# Patient Record
Sex: FEMALE | Race: WHITE | NOT HISPANIC OR LATINO | Employment: FULL TIME | URBAN - METROPOLITAN AREA
[De-identification: names, ages, dates, MRNs, and addresses within clinical notes are randomized per-mention and may not be internally consistent; named-entity substitution may affect disease eponyms.]

---

## 2024-05-15 PROBLEM — I10 HYPERTENSION: Status: ACTIVE | Noted: 2017-04-17

## 2024-05-15 PROBLEM — E78.5 HYPERLIPIDEMIA: Status: ACTIVE | Noted: 2017-04-17

## 2024-05-15 PROBLEM — N39.3 STRESS INCONTINENCE: Status: ACTIVE | Noted: 2024-05-15

## 2024-05-15 PROBLEM — M51.36 DEGENERATION OF INTERVERTEBRAL DISC OF LUMBAR REGION: Status: ACTIVE | Noted: 2018-03-14

## 2024-05-15 PROBLEM — M79.7 FIBROMYOSITIS: Status: ACTIVE | Noted: 2018-03-14

## 2024-05-15 PROBLEM — G47.10 HYPERSOMNIA: Status: ACTIVE | Noted: 2020-01-20

## 2024-05-15 PROBLEM — E55.9 VITAMIN D DEFICIENCY: Status: ACTIVE | Noted: 2017-04-17

## 2024-05-15 PROBLEM — E66.9 OBESITY WITH BODY MASS INDEX 30 OR GREATER: Status: ACTIVE | Noted: 2020-06-15

## 2024-05-15 PROBLEM — K21.9 GERD (GASTROESOPHAGEAL REFLUX DISEASE): Status: ACTIVE | Noted: 2024-05-15

## 2024-05-15 PROBLEM — G47.33 OBSTRUCTIVE SLEEP APNEA: Status: ACTIVE | Noted: 2024-05-15

## 2024-05-15 PROBLEM — R35.0 URINARY FREQUENCY: Status: ACTIVE | Noted: 2024-05-15

## 2024-05-15 PROBLEM — M17.9 OSTEOARTHRITIS OF KNEE: Status: ACTIVE | Noted: 2018-03-14

## 2024-05-15 PROBLEM — M47.817 SPONDYLOSIS OF LUMBOSACRAL SPINE WITHOUT MYELOPATHY: Status: ACTIVE | Noted: 2018-03-14

## 2024-05-15 PROBLEM — K65.4 SCLEROSING MESENTERITIS (MULTI): Status: ACTIVE | Noted: 2024-05-15

## 2024-05-16 RX ORDER — TOPIRAMATE 50 MG/1
50 TABLET, FILM COATED ORAL
COMMUNITY
Start: 2022-09-28

## 2024-05-16 RX ORDER — FLUTICASONE FUROATE AND VILANTEROL 200; 25 UG/1; UG/1
1 POWDER RESPIRATORY (INHALATION)
COMMUNITY

## 2024-05-16 RX ORDER — ALBUTEROL SULFATE 0.83 MG/ML
2.5 SOLUTION RESPIRATORY (INHALATION) EVERY 6 HOURS PRN
COMMUNITY
Start: 2024-04-15

## 2024-05-16 RX ORDER — ESCITALOPRAM OXALATE 20 MG/1
1 TABLET ORAL
COMMUNITY
Start: 2024-03-10

## 2024-05-16 RX ORDER — LOSARTAN POTASSIUM 25 MG/1
25 TABLET ORAL DAILY
COMMUNITY
Start: 2023-01-24 | End: 2024-05-17 | Stop reason: ALTCHOICE

## 2024-05-16 RX ORDER — NORTRIPTYLINE HYDROCHLORIDE 50 MG/1
50 CAPSULE ORAL
COMMUNITY
Start: 2023-06-29 | End: 2024-05-17 | Stop reason: ALTCHOICE

## 2024-05-16 RX ORDER — PANTOPRAZOLE SODIUM 20 MG/1
20 TABLET, DELAYED RELEASE ORAL
COMMUNITY
Start: 2022-07-14 | End: 2024-05-17 | Stop reason: ALTCHOICE

## 2024-05-16 RX ORDER — MELOXICAM 7.5 MG/1
7.5 TABLET ORAL
COMMUNITY
Start: 2022-09-27 | End: 2024-05-17 | Stop reason: ALTCHOICE

## 2024-05-16 RX ORDER — RIZATRIPTAN BENZOATE 10 MG/1
10 TABLET, ORALLY DISINTEGRATING ORAL
COMMUNITY
Start: 2024-05-01 | End: 2025-05-01

## 2024-05-16 RX ORDER — ATORVASTATIN CALCIUM 20 MG/1
20 TABLET, FILM COATED ORAL
COMMUNITY
Start: 2022-05-28

## 2024-05-16 RX ORDER — ONDANSETRON 4 MG/1
4 TABLET, ORALLY DISINTEGRATING ORAL EVERY 8 HOURS PRN
COMMUNITY
Start: 2023-06-29 | End: 2024-06-28

## 2024-05-17 ENCOUNTER — OFFICE VISIT (OUTPATIENT)
Dept: CARDIOLOGY | Facility: CLINIC | Age: 54
End: 2024-05-17

## 2024-05-17 VITALS
BODY MASS INDEX: 38.16 KG/M2 | OXYGEN SATURATION: 97 % | DIASTOLIC BLOOD PRESSURE: 69 MMHG | HEART RATE: 66 BPM | WEIGHT: 243.1 LBS | SYSTOLIC BLOOD PRESSURE: 104 MMHG | HEIGHT: 67 IN

## 2024-05-17 DIAGNOSIS — I10 HYPERTENSION, UNSPECIFIED TYPE: Primary | ICD-10-CM

## 2024-05-17 DIAGNOSIS — R07.89 CHEST TIGHTNESS: ICD-10-CM

## 2024-05-17 DIAGNOSIS — I49.3 PVC (PREMATURE VENTRICULAR CONTRACTION): ICD-10-CM

## 2024-05-17 PROCEDURE — 3074F SYST BP LT 130 MM HG: CPT | Performed by: INTERNAL MEDICINE

## 2024-05-17 PROCEDURE — 3078F DIAST BP <80 MM HG: CPT | Performed by: INTERNAL MEDICINE

## 2024-05-17 PROCEDURE — 99203 OFFICE O/P NEW LOW 30 MIN: CPT | Performed by: INTERNAL MEDICINE

## 2024-05-17 PROCEDURE — 1036F TOBACCO NON-USER: CPT | Performed by: INTERNAL MEDICINE

## 2024-05-17 PROCEDURE — 99213 OFFICE O/P EST LOW 20 MIN: CPT | Performed by: INTERNAL MEDICINE

## 2024-05-17 RX ORDER — ATENOLOL 25 MG/1
25 TABLET ORAL DAILY
Qty: 90 TABLET | Refills: 3 | Status: SHIPPED | OUTPATIENT
Start: 2024-05-17 | End: 2025-05-17

## 2024-05-17 RX ORDER — ALBUTEROL SULFATE 90 UG/1
1 AEROSOL, METERED RESPIRATORY (INHALATION)
COMMUNITY
Start: 2024-05-11

## 2024-05-17 ASSESSMENT — ENCOUNTER SYMPTOMS
VOMITING: 0
NAUSEA: 0
HEMOPTYSIS: 0
LOSS OF SENSATION IN FEET: 0
HEMATURIA: 0
MYALGIAS: 0
FEVER: 0
DYSURIA: 0
WHEEZING: 0
CHILLS: 0
MEMORY LOSS: 0
ALTERED MENTAL STATUS: 0
OCCASIONAL FEELINGS OF UNSTEADINESS: 0
COUGH: 0
CONSTIPATION: 0
ABDOMINAL PAIN: 0
BLOATING: 0
DEPRESSION: 0
HEADACHES: 0
DIARRHEA: 0
FALLS: 0

## 2024-05-17 ASSESSMENT — PATIENT HEALTH QUESTIONNAIRE - PHQ9
10. IF YOU CHECKED OFF ANY PROBLEMS, HOW DIFFICULT HAVE THESE PROBLEMS MADE IT FOR YOU TO DO YOUR WORK, TAKE CARE OF THINGS AT HOME, OR GET ALONG WITH OTHER PEOPLE: NOT DIFFICULT AT ALL
SUM OF ALL RESPONSES TO PHQ9 QUESTIONS 1 AND 2: 2
2. FEELING DOWN, DEPRESSED OR HOPELESS: SEVERAL DAYS
1. LITTLE INTEREST OR PLEASURE IN DOING THINGS: SEVERAL DAYS

## 2024-05-17 ASSESSMENT — COLUMBIA-SUICIDE SEVERITY RATING SCALE - C-SSRS
6. HAVE YOU EVER DONE ANYTHING, STARTED TO DO ANYTHING, OR PREPARED TO DO ANYTHING TO END YOUR LIFE?: NO
1. IN THE PAST MONTH, HAVE YOU WISHED YOU WERE DEAD OR WISHED YOU COULD GO TO SLEEP AND NOT WAKE UP?: NO
2. HAVE YOU ACTUALLY HAD ANY THOUGHTS OF KILLING YOURSELF?: NO

## 2024-05-17 NOTE — PROGRESS NOTES
Chief Complaint   Patient presents with    New Patient Visit    Chest Pain    PVCs       HPI  54 yo WF w/ h/o PVCs, HTN, HLD, asthma, migraines now here for cardiology consult. She has occ mid chest tight at rest x hours, no radiation, +assoc dyspnea, no N/V/diaph; not helped with MDI/neb.   +occ mild HARTLEY (mod exertion). No orthopnea/PND. +rare palps/skips. No LH/dizzy/syncope. No edema. No claudication. +occ cough. +occ fatigue.  ECG 5/23:SR (87), PVC, LV  ECG 4/24: SR (79), PVC, PRP  HM 2/24: per patient, 10k PVCs  CCS 5/22: 1 (LM), nl heart size, no peric eff, AsAo 3.0cm  CXR 5/24: no acute abnl  CT ab 3/23: vasc WNL    Review of Systems   Constitutional: Negative for chills, fever and malaise/fatigue.   HENT:  Negative for hearing loss.    Eyes:  Negative for visual disturbance.   Respiratory:  Negative for cough, hemoptysis and wheezing.    Skin:  Negative for rash.   Musculoskeletal:  Negative for falls and myalgias.   Gastrointestinal:  Negative for bloating, abdominal pain, constipation, diarrhea, dysphagia, nausea and vomiting.   Genitourinary:  Negative for dysuria and hematuria.   Neurological:  Negative for headaches.   Psychiatric/Behavioral:  Negative for altered mental status, depression and memory loss.       Social History     Tobacco Use    Smoking status: Never    Smokeless tobacco: Never   Substance Use Topics    Alcohol use: Not Currently      No pertinent FHX    Allergies   Allergen Reactions    Propoxyphene N-Acetaminophen Unknown    Lisinopril Cough    Propoxyphene Hcl Unknown and Nausea/vomiting    Tramadol Dizziness and Nausea/vomiting      Current Outpatient Medications   Medication Instructions    albuterol 2.5 mg, inhalation, Every 6 hours PRN    atorvastatin (LIPITOR) 20 mg, oral, Daily RT    escitalopram (Lexapro) 20 mg tablet 1 tablet, oral, Daily RT    fluticasone furoate-vilanteroL (Breo Ellipta) 200-25 mcg/dose inhaler 1 puff, inhalation, Daily RT    losartan (COZAAR) 25 mg, oral,  Daily    ondansetron ODT (ZOFRAN-ODT) 4 mg, oral, Every 8 hours PRN    rizatriptan MLT (MAXALT-MLT) 10 mg, oral    topiramate (TOPAMAX) 50 mg      Vitals:    05/17/24 1346   BP: 104/69   Pulse: 66   SpO2:       Physical Exam  Constitutional:       Appearance: Normal appearance.   HENT:      Head: Normocephalic and atraumatic.      Nose: Nose normal.   Neck:      Vascular: No carotid bruit.   Cardiovascular:      Rate and Rhythm: Normal rate and regular rhythm.      Heart sounds: No murmur heard.  Pulmonary:      Effort: Pulmonary effort is normal.      Breath sounds: Normal breath sounds.   Abdominal:      Palpations: Abdomen is soft.      Tenderness: There is no abdominal tenderness.   Musculoskeletal:      Right lower leg: No edema.      Left lower leg: No edema.   Skin:     General: Skin is warm and dry.   Neurological:      General: No focal deficit present.      Mental Status: She is alert.   Psychiatric:         Mood and Affect: Mood normal.         Judgment: Judgment normal.        Labs  5/24 Cr 0.74, K 4.2, Mg 2.0, LFT nl, HGB 14.4, , TPN neg, pBNP 66  1/24 , HDL 35, , CHOl 196    Assessment/Plan   52 yo WF w/ h/o PVCs, HTN, HLD, asthma, migraines now w/ occ atypical chest tight of unclear etiology, ?MSK/anxiety. Hours of CP with neg TPN goes against cardiac etiology. Try changing Losartan to Atenolol (also may help with PVCs) - of note, she felt poorly on Metoprolol (memory loss, emotional lability). Continue Atorva. FU PRN    Dougie Littlejohn MD